# Patient Record
Sex: FEMALE
[De-identification: names, ages, dates, MRNs, and addresses within clinical notes are randomized per-mention and may not be internally consistent; named-entity substitution may affect disease eponyms.]

---

## 2022-08-17 PROBLEM — Z00.00 ENCOUNTER FOR PREVENTIVE HEALTH EXAMINATION: Status: ACTIVE | Noted: 2022-08-17

## 2022-12-30 ENCOUNTER — APPOINTMENT (OUTPATIENT)
Dept: GYNECOLOGIC ONCOLOGY | Facility: CLINIC | Age: 37
End: 2022-12-30
Payer: COMMERCIAL

## 2022-12-30 VITALS
WEIGHT: 145 LBS | DIASTOLIC BLOOD PRESSURE: 77 MMHG | OXYGEN SATURATION: 99 % | HEIGHT: 64 IN | BODY MASS INDEX: 24.75 KG/M2 | SYSTOLIC BLOOD PRESSURE: 116 MMHG | HEART RATE: 89 BPM | TEMPERATURE: 98.5 F

## 2022-12-30 DIAGNOSIS — Z85.41 PERSONAL HISTORY OF MALIGNANT NEOPLASM OF CERVIX UTERI: ICD-10-CM

## 2022-12-30 PROCEDURE — 99205 OFFICE O/P NEW HI 60 MIN: CPT

## 2023-01-05 NOTE — DISCUSSION/SUMMARY
[Reviewed Clinical Lab Test(s)] : Results of clinical tests were reviewed. [Discuss Alternatives/Risks/Benefits w/Patient] : All alternatives, risks, and benefits were discussed with the patient/family and all questions were answered.  Patient expressed good understanding and appreciates the importance of follow up as recommended. [Visit Time ___ Minutes] : [unfilled] minutes [Face to Face Time___ Minutes] : with [unfilled] minutes in face to face consultation. [FreeTextEntry1] : 38yo w/ hx of stage IA adenoca of cervix , DFI 8yrs. Recent pap wnl. Exam wnl. NICHOLE\par -more than 50% of visit spent face to face with patient reviewing records and interpreting imaging/path/lab results, counseling and coordinating care. I reviewed diagnosis, stage, long DFI interval. Reviewed low risk of recurrence and reviewed surveillance guidelines.\par -continue annual pap smears until 10yrs DFI. If continues to be negative can space out to q3yrs- 5yrs.\par -rtc prn. continue care with general GYN\par -pain/fever/bleeding precautions given\par

## 2023-01-05 NOTE — HISTORY OF PRESENT ILLNESS
[FreeTextEntry1] : 36yo P3 here to establish care. She has been following with her general GYN with annual pap smears\par \par Dx: Stage IA adenocarcinoma of cervix\par Tx: [place] tlh/bs \par Adjtx: none\par \par \par Pmhx: cervix ca hx\par Surghx: hyst/bs , acl surgery 2017\par OBGYNhx:  x3, hx as above, no hx of fibroids/cysts/stis\par Famhx: no  gyn ca, breast ca, colon ca\par Sochx: + etoh 3-5 per week. no smoking , no drugs\par \par Pap 2022 - NILM\par \par Patient reports doing well. She denies pain/fever/bleeding/bloating. She has normal activity tolerance and normal appetite. Reports normal urination and BMs\par

## 2023-01-05 NOTE — LETTER BODY
[Dear  ___] : Dear  [unfilled], [FreeTextEntry2] : \par Thank you for referring NANCIE LIN to [facility]. I had the pleasure of meeting with Ms. NANCIE LIN on 12/30/22. Please find my consultation note attached. Thank you for your trust and confidence in me and our practice, it means a great deal to us. Please feel free to contact me at anytime.\par Sincerely, \par \par Dr. Nguyen Martell\par Office: [phone]\par Fax: [phone]\par

## 2024-05-10 ENCOUNTER — NON-APPOINTMENT (OUTPATIENT)
Age: 39
End: 2024-05-10

## 2024-10-21 ENCOUNTER — NON-APPOINTMENT (OUTPATIENT)
Age: 39
End: 2024-10-21

## 2025-08-09 ENCOUNTER — NON-APPOINTMENT (OUTPATIENT)
Age: 40
End: 2025-08-09